# Patient Record
Sex: MALE | Race: BLACK OR AFRICAN AMERICAN
[De-identification: names, ages, dates, MRNs, and addresses within clinical notes are randomized per-mention and may not be internally consistent; named-entity substitution may affect disease eponyms.]

---

## 2020-01-03 ENCOUNTER — HOSPITAL ENCOUNTER (OUTPATIENT)
Dept: HOSPITAL 62 - WI | Age: 20
End: 2020-01-03
Attending: NURSE PRACTITIONER
Payer: MEDICAID

## 2020-01-03 DIAGNOSIS — N63.10: Primary | ICD-10-CM

## 2020-01-03 PROCEDURE — 76641 ULTRASOUND BREAST COMPLETE: CPT

## 2020-01-03 NOTE — WOMENS IMAGING REPORT
EXAM DESCRIPTION:  U/S BREAST UNILATERAL, COMPL



COMPLETED DATE/TIME:  1/3/2020 2:23 pm



REASON FOR STUDY:  N63.10 UNSPECIFIED LUMP IN THE RIGHT BREAST, UNSPECIFIED QUADRANT N63.10  UNSPECIF
IED LUMP IN THE RIGHT BREAST, UNSPECIFIED CARY



COMPARISON:  None.



TECHNIQUE:  Real-time and static grayscale imaging performed of the right breast targeted to the area
 of clinical/mammographic concern. Selected color Doppler images recorded.



LIMITATIONS:  None.



FINDINGS:  MASS: Superficial well-circumscribed hypoechoic nodule with echogenic hilum measuring 8 x 
3 x 10 mm.

OTHER: No other significant finding.



IMPRESSION:  Benign lymph node.



BIRAD:  2 Benign findings.



RECOMMENDATION:  RECOMMENDED FOLLOW-UP: Follow-up as clinically indicated.



COMMENT:  The [employer] College [employer] (ACR) has developed recommendations for screening MRI of 
the breasts in certain patient populations, to be used in conjunction with mammography.  Breast MRI s
urveillance may be appropriate for women with more than 20% lifetime risk of developing breast cancer
  as determined by genetic testing, significant family history of the disease, or history of mantle r
adiation for Hodgkins Disease.  ACR Practice Guidelines 2008.



TECHNICAL DOCUMENTATION:  JOB ID:  [id number]

 2011 [employer]- All Rights Reserved



Reading location - IP/workstation name: [email or web address]